# Patient Record
Sex: FEMALE | Race: WHITE | NOT HISPANIC OR LATINO | Employment: FULL TIME | URBAN - METROPOLITAN AREA
[De-identification: names, ages, dates, MRNs, and addresses within clinical notes are randomized per-mention and may not be internally consistent; named-entity substitution may affect disease eponyms.]

---

## 2017-08-09 ENCOUNTER — ALLSCRIPTS OFFICE VISIT (OUTPATIENT)
Dept: OTHER | Facility: OTHER | Age: 37
End: 2017-08-09

## 2017-09-13 ENCOUNTER — ALLSCRIPTS OFFICE VISIT (OUTPATIENT)
Dept: OTHER | Facility: OTHER | Age: 37
End: 2017-09-13

## 2017-10-18 ENCOUNTER — GENERIC CONVERSION - ENCOUNTER (OUTPATIENT)
Dept: OTHER | Facility: OTHER | Age: 37
End: 2017-10-18

## 2017-11-29 ENCOUNTER — ALLSCRIPTS OFFICE VISIT (OUTPATIENT)
Dept: OTHER | Facility: OTHER | Age: 37
End: 2017-11-29

## 2018-01-11 NOTE — MISCELLANEOUS
Message   Recorded as Task   Date: 11/07/2017 12:24 PM, Created By: Shaan Mckee   Task Name: Follow Up   Assigned To: Minal Fernandez   Regarding Patient: Selene Guzmán, Status: Active   [facility] - 07 Nov 2017 12:24 PM     TASK CREATED  Caller: Self; Other; [phone] (Home)  Pt calling with questions regarding the current dosages of meds  She can be reached at [phone]  Thank you   Pt states anxiety and moods have been worse the past couple weeks    On 10 mg Viibryd and no SE- will increase to 20 mg and follow up at appt on 11-29      Plan  Chronic major depressive disorder, recurrent episode, ZARINA (generalized anxiety  disorder)    · From  Viibryd 10 MG Oral Tablet 1 PO qAM with Food To Viibryd 20 MG Oral  Tablet 1 PO QD    Signatures   Electronically signed by : KAY Hou; Nov 7 2017  1:31PM EST                       (Author)

## 2018-01-11 NOTE — PSYCH
Psych Med Mgmt    Appearance: was calm and cooperative, adequate hygiene and grooming and good eye contact  Observed mood: mood appropriate  Observed mood: affect appropriate  Speech: a normal rate  Thought processes: coherent/organized  Hallucinations: no hallucinations present  Thought Content: no delusions  Abnormal Thoughts: The patient has no suicidal thoughts and no homicidal thoughts  Orientation: The patient is oriented to person, place and time  Recent and Remote Memory: short term memory intact and long term memory intact  Attention Span And Concentration: concentration intact  Insight: Insight intact  Judgment: Her judgment was intact  Muscle Strength And Tone  Normal gait and station  Treatment Recommendations: Viibryd 20 mg po qd    Pt partner is going to carry pregnancy when they decide to start a family  Risks, Benefits And Possible Side Effects Of Medications: Risks, benefits, and possible side effects of medications explained to patient and patient verbalizes understanding, Risks of medications explained if female patient  Patient verbalizes understanding and agrees to notify her doctor if she becomes pregnant  She reports normal appetite, normal energy level, no weight change and normal number of sleep hours  Pt seen for follow up ZARINA  Pt states she is feeling better with Viibryd 20 mg po daily  Pt states she is taking Viibryd with lunch  Shes now also following a vegan diet  He focus and concentration has been good  Work has been busy but she is handling things well  Sleeping fairly well  Appetite is good  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 and she is excited about that and enjoys animals  She is more motivated and less anxious  No panic attacks  No depressive episodes or tearfulness for no reasons        Vitals  Signs   Recorded: 61VLJ8775 01:17PM   Respiration: 16  Height: 5 ft 5 in  Weight: 130 lb   BMI Calculated: 21 63  BSA Calculated: 1 65    DSM    Provisional Diagnosis: MDD  ZARINA  Assessment    1  Chronic major depressive disorder, recurrent episode (296 30) (F33 9)   2  ZARINA (generalized anxiety disorder) (300 02) (F41 1)    Plan    1  Viibryd 20 MG Oral Tablet; 1 PO QD    Review of Systems    Constitutional: No fever, no chills, feels well, no tiredness, no recent weight gain or loss  Cardiovascular: no complaints of slow or fast heart rate, no chest pain, no palpitations  Respiratory: no complaints of shortness of breath, no wheezing, no dyspnea on exertion  Gastrointestinal: no complaints of abdominal pain, no constipation, no nausea, no diarrhea, no vomiting  Genitourinary: no complaints of dysuria, no incontinence, no pelvic pain, no urinary frequency  Musculoskeletal: no complaints of arthralgia, no myalgias, no limb pain, no joint stiffness  Integumentary: no complaints of skin rash, no itching, no dry skin  Neurological: no complaints of headache, no confusion, no numbness, no dizziness  Active Problems    1  Chronic major depressive disorder, recurrent episode (296 30) (F33 9)   2  ZARINA (generalized anxiety disorder) (300 02) (F41 1)   3  History of hypothyroidism (V12 29) (Z86 39)    Past Medical History    1  History of hypothyroidism (V12 29) (Z86 39)    Allergies    1  Bactrim TABS    Current Meds   1  Levothyroxine Sodium TABS; Therapy: (Recorded:06Frn8267) to Recorded   2  Viibryd 20 MG Oral Tablet; 1 PO QD; Therapy: 35AFR4349 to (Last ED:24MIC1538)  Requested for: 12JZF6757 Ordered    The medication list was reviewed and updated today  Family Psych History  Mother    1  Family history of Recurrent major depressive disorder, remission status unspecified    Social History    · Former smoker (U92 02) (W01 601)  The social history was reviewed and is unchanged  End of Encounter Meds    1  Viibryd 20 MG Oral Tablet; 1 PO QD;    Therapy: 66IWC0991 to (Last Rx:29Nov2017)  Requested for: 12AET2404 Ordered    2  Levothyroxine Sodium TABS;    Therapy: (Recorded:29Zmg9456) to Recorded    Signatures   Electronically signed by : Karon Fajardo, [facility]; Nov 29 2017  1:30PM EST                       (Author)    Electronically signed by : Unique Ham MD; Nov 30 2017  4:47PM EST

## 2018-01-13 VITALS — BODY MASS INDEX: 21.66 KG/M2 | RESPIRATION RATE: 16 BRPM | WEIGHT: 130 LBS | HEIGHT: 65 IN

## 2018-01-16 NOTE — PSYCH
Assessment    1  History of hypothyroidism (V12 29) (Z86 39)   2  ZARINA (generalized anxiety disorder) (300 02) (F41 1)   3  Chronic major depressive disorder, recurrent episode (296 30) (F33 9)    Plan    1  Viibryd 10 MG Oral Tablet; 1 PO qAM with Food    Chief Complaint  "I guess my moods have been really overwhelmed- the depression has come back"      History of Present Illness  Pt is 30year old  female with history of anxiety and depression  She states she has a low mood  Feels easily overwhelmed and easily tearful  She has mood swings where she feels very irritable, angry and guilt feelings  She denies suicidal ideation but states she feels at times she does not want to go on  She sates she has struggles with anxiety since being in high school  She does not like to be the center of attention  States she had difficulty making friends especially in college- she kept to herself and was feliz drinking on the weekends  She reports a history of cutting superficially in college- none recent  She reports the drinking continued after college and she received 2 DUIs and went to rehab for 30 days in [place] at the age of 29  She has been sober since  She states she also struggled with her sexuality and did not "come out" until the age of 31  She does reports her family has been supportive  Her current spouse was her first romantic relationship  She states she did take Celexa and Lexapro for about 1 year each and did feel much better  She stopped psych meds November 2016 because she thought she no longer needed it when she was started on synthroid for hypothyroid  She states her moods have been declining the past few months  She is anxious, depressed, tearful with poor motivation  She states she falls asleep but awakens often due to racing thoughts- worries about things she said to people days ago, thinks about things that she feels should not be bothering her  She feels tired then during day    She reports a decrease in appetite and 6 pound weight loss the past 6 months  No history of eating disorder  Review of Systems  anxiety, depression, emotional lability and sleep disturbances  Constitutional: No fever, no chills, no recent weight gain or recent weight loss  ENT: no ear ache, no loss of hearing, no nosebleeds or nasal discharge, no sore throat or hoarseness  Cardiovascular: no complaints of slow or fast heart rate, no chest pain, no palpitations, no leg claudication or lower extremity edema  Respiratory: no complaints of shortness of breath, no wheezing, no dyspnea on exertion, no orthopnea or PND  Gastrointestinal: no complaints of abdominal pain, no constipation, no nausea or diarrhea, no vomiting, no bloody stools  Genitourinary: no complaints of dysuria, no incontinence, no pelvic pain, no dysmenorrhea, no vaginal discharge or abnormal vaginal bleeding  Musculoskeletal: no complaints of arthralgia, no myalgia, no joint swelling or stiffness, no limb pain or swelling  Neurological: no complaints of headache, no confusion, no numbness or tingling, no dizziness or fainting  Past Psychiatric History    Past Psychiatric History: Seen at [facility] [phone]  Follows with therapist Babita Bradford x 6 years    No inpt psych but rehab in [place] in 2012 for 30 days- started on Celexa and Neurontin at that time and took for 1 year and took for 1 year  She denies suicide attempts but   she states she took half a whipple of Tylenol when she was about 23years old when she was intoxicated- she does not recall the event as she was intoxicated    Was on Lexapro from PCP - took for about a year and stopped in November  Wellbutrin took once and caused increased anxiety  Substance Abuse Hx    Substance Abuse History: History of ETOH abuse-binge drinking on weekends from age 14- age 29  Sober x 5 years  No other substance use  Past Medical History    1   History of hypothyroidism (V12 29) (Z86 39)    The active problems and past medical history were reviewed and updated today  Allergies    1  Bactrim TABS    Current Meds    The medication list was reviewed and updated today  Family Psych History  Mother    1  Family history of Recurrent major depressive disorder, remission status unspecified  Parents are supportive  Parents are supportive  2 sisters ages 45 and 28- she is middle child    Older sister had postpartum depression  Mother - hospitalized in her 25s- on celexa for anxiety and depression  Paternal uncle is bipolar  Maternal aunt depression  Maternal grandmother was alcoholic and passed away       The family history was reviewed and updated today  Social History    · Former smoker (E28 38) (U95 099)  The social history was reviewed and updated today   to her female partner x 1 year  "Came out of closet" at age 31    Employed by [employer]  History of ETOH abuse - binge drinker on weekends x 14 years  She had 2 DUIs and went ot rehab in [place] in 2012    No current legal issues  Hx of nicotine but quit  She graduated from high school in [address] [facility] to [facility] in [address] to [facility] for [address] and worked fro 1 week in [facility] but quit due to anxiety  No current ETOH iuse  No drug use or substance abuse      Vitals  Signs   Recorded: 14Jtf8082 04:22PM   Heart Rate: 70  Respiration: 16  Systolic: 108  Diastolic: 76  Recorded: 90VTV7291 04:21PM   Respiration: 16  Recorded: 16Tll5867 03:56PM   Height: 5 ft 5 in  Weight: 130 lb   BMI Calculated: 21 63  BSA Calculated: 1 65    Physical Exam    Appearance: was calm and cooperative, adequate hygiene and grooming and good eye contact  Observed mood: depressed and anxious  Observed mood: affect was tearful  Speech: a normal rate  Thought processes: coherent/organized  Hallucinations: no hallucinations present  Thought Content: no delusions  Abnormal Thoughts: The patient has no suicidal thoughts and no homicidal thoughts  Orientation: The patient is oriented to person, place and time  Recent and Remote Memory: short term memory intact and long term memory intact  Attention Span And Concentration: concentration intact  Insight: Insight intact  Judgment: Her judgment was intact  Muscle Strength And Tone  Normal gait and station  Treatment Recommendations: Viibryd 10 mg po qd with food      Discussed zoloft as a second option for her if this is not covered  Risks, Benefits And Possible Side Effects Of Medications: Risks, benefits, and possible side effects of medications explained to patient and patient verbalizes understanding  DSM    Provisional Diagnosis: ZARINA  MDD  End of Encounter Meds    1  Viibryd 10 MG Oral Tablet; 1 PO qAM with Food; Therapy: 65AWW8566 to (Last Rx:04Tfw2670)  Requested for: 33Tps3820; Status:   [facility] Ordered    Signatures   Electronically signed by : Jaelyn Crabtree, [facility];  Aug  9 2017  4:41PM EST                       (Author)    Electronically signed by : Glenda Cruz MD; Aug  9 2017  4:43PM EST

## 2018-01-16 NOTE — PSYCH
Psych Med Mgmt    Appearance: was calm and cooperative  Observed mood: mood appropriate  Observed mood: affect appropriate  Speech: a normal rate  Thought processes: coherent/organized  Hallucinations: no hallucinations present  Thought Content: no delusions  Abnormal Thoughts: The patient has no suicidal thoughts and no homicidal thoughts  Orientation: The patient is oriented to person, place and time  Recent and Remote Memory: short term memory intact and long term memory intact  Attention Span And Concentration: concentration intact  Insight: Insight intact  Judgment: Her judgment was intact  Muscle Strength And Tone  Normal gait and station  Treatment Recommendations: Viibryd 10 mg po qd  Risks, Benefits And Possible Side Effects Of Medications: Risks, benefits, and possible side effects of medications explained to patient and patient verbalizes understanding  She reports normal appetite, normal energy level, no weight change and normal number of sleep hours  Pt seen for follow up ZARINA  She states she is feeling much better with VIibryd  She states she had some diarrhea with the Viibryd but this is subsiding  No other side effects and overall feels much less anxious  More motivated and accomplishing things  She is sleeping well now about 6-8 hours  Appetite is good  She is on steroid dose pack for poison ivy- discussed possible side effects with steroids and mood with pt  Overall she is much improved and tolerating Viibryd well  She will monitor PMS symptoms and severity as we continue with same low does of Viibryd  Vitals  Signs   Recorded: 24Erj2754 08:45AM   Respiration: 16  Height: 5 ft 5 in  Weight: 130 lb   BMI Calculated: 21 63  BSA Calculated: 1 65    DSM    Provisional Diagnosis: ZARINA  MDD  Assessment    1  ZARINA (generalized anxiety disorder) (300 02) (F41 1)   2  Chronic major depressive disorder, recurrent episode (296 30) (F33 9)    Plan    1  Viibryd 10 MG Oral Tablet; 1 PO qAM with Food    Review of Systems    Constitutional: No fever, no chills, feels well, no tiredness, no recent weight gain or loss  Cardiovascular: no complaints of slow or fast heart rate, no chest pain, no palpitations  Respiratory: no complaints of shortness of breath, no wheezing, no dyspnea on exertion  Gastrointestinal: no complaints of abdominal pain, no constipation, no nausea, no diarrhea, no vomiting  Genitourinary: no complaints of dysuria, no incontinence, no pelvic pain, no urinary frequency  Musculoskeletal: no complaints of arthralgia, no myalgias, no limb pain, no joint stiffness  Integumentary: no complaints of skin rash, no itching, no dry skin  Active Problems    1  Chronic major depressive disorder, recurrent episode (296 30) (F33 9)   2  ZARINA (generalized anxiety disorder) (300 02) (F41 1)   3  History of hypothyroidism (V12 29) (Z86 39)    Past Medical History    1  History of hypothyroidism (V12 29) (Z86 39)    Allergies    1  Bactrim TABS    Current Meds   1  Levothyroxine Sodium TABS; Therapy: (Recorded:60Kbu0840) to Recorded   2  Viibryd 10 MG Oral Tablet; 1 PO qAM with Food; Therapy: 64PPE7047 to (Last Rx:40Fyk8398)  Requested for: 15Hpu5607 Ordered    The medication list was reviewed and updated today  Family Psych History  Mother    1  Family history of Recurrent major depressive disorder, remission status unspecified    Social History    · Former smoker (V31 76) (T49 360)  The social history was reviewed and is unchanged  End of Encounter Meds    1  Viibryd 10 MG Oral Tablet; 1 PO qAM with Food; Therapy: 68TKL1858 to (Last Rx:41Ohs2778)  Requested for: 40Cmj8165 Ordered    2  Levothyroxine Sodium TABS;    Therapy: (Recorded:48Afe3477) to Recorded    Signatures   Electronically signed by : Clarissa Urbina, [facility]; Sep 13 2017  9:02AM EST                       (Author)    Electronically signed by : Tomasa Kim MD; Sep 13 2017 4:09PM EST

## 2018-01-22 VITALS — BODY MASS INDEX: 21.66 KG/M2 | WEIGHT: 130 LBS | RESPIRATION RATE: 16 BRPM | HEIGHT: 65 IN

## 2018-01-22 VITALS
SYSTOLIC BLOOD PRESSURE: 119 MMHG | WEIGHT: 130 LBS | RESPIRATION RATE: 16 BRPM | DIASTOLIC BLOOD PRESSURE: 76 MMHG | HEIGHT: 65 IN | BODY MASS INDEX: 21.66 KG/M2 | HEART RATE: 70 BPM

## 2018-02-21 ENCOUNTER — OFFICE VISIT (OUTPATIENT)
Dept: PSYCHIATRY | Facility: CLINIC | Age: 38
End: 2018-02-21
Payer: COMMERCIAL

## 2018-02-21 VITALS — HEIGHT: 65 IN | BODY MASS INDEX: 21.66 KG/M2 | WEIGHT: 130 LBS | RESPIRATION RATE: 16 BRPM

## 2018-02-21 DIAGNOSIS — F33.9 CHRONIC MAJOR DEPRESSIVE DISORDER, RECURRENT EPISODE (HCC): Primary | ICD-10-CM

## 2018-02-21 DIAGNOSIS — F41.1 GAD (GENERALIZED ANXIETY DISORDER): ICD-10-CM

## 2018-02-21 PROCEDURE — 99214 OFFICE O/P EST MOD 30 MIN: CPT | Performed by: PHYSICIAN ASSISTANT

## 2018-02-21 RX ORDER — GABAPENTIN 100 MG/1
100 CAPSULE ORAL EVERY 6 HOURS PRN
Qty: 60 CAPSULE | Refills: 3 | Status: SHIPPED | OUTPATIENT
Start: 2018-02-21 | End: 2018-05-23

## 2018-02-21 RX ORDER — VILAZODONE HYDROCHLORIDE 20 MG/1
TABLET ORAL DAILY
COMMUNITY
Start: 2017-08-09 | End: 2018-02-21 | Stop reason: SDUPTHER

## 2018-02-21 RX ORDER — VILAZODONE HYDROCHLORIDE 20 MG/1
20 TABLET ORAL DAILY
Qty: 30 TABLET | Refills: 3 | Status: SHIPPED | OUTPATIENT
Start: 2018-02-21 | End: 2018-05-23 | Stop reason: SDUPTHER

## 2018-02-21 NOTE — PSYCH
PROGRESS NOTE        [facility]      Name and Date of Birth:  Mike Epps 30 y o  1980    Date of Visit: 02/21/18    SUBJECTIVE:  Pt seen for follow up Depression/ anxiety  She states she feels "alright"    She states she continues with some anxiety and feels as though she has difficulties handling stressors  She denies suicidal ideation, intent or plan at present, has no suicidal ideation, intent or plan at present  She denies any auditory hallucinations and visual hallucinations, denies any other delusional thinking, denies any delusional thinking  She denies any side effects from medications    HPI ROS Appetite Changes and Sleep: normal appetite, normal sleep    Review Of Systems:      Constitutional Negative   ENT Negative   Cardiovascular Negative   Respiratory Negative   Gastrointestinal Negative   Genitourinary Negative   Musculoskeletal Negative   Integumentary Negative   Neurological Negative   Endocrine Negative   Other Symptoms Negative and None       Laboratory Results: No results found for this or any previous visit  Substance Abuse History:    History   Drug use: Unknown       Family Psychiatric History:     No family history on file  The following portions of the patient's history were reviewed and updated as appropriate: past family history, past medical history, past social history, past surgical history and problem list     Social History     Social History    Marital status: /Civil Union     Spouse name: N/A    Number of children: N/A    Years of education: N/A     Occupational History    Not on file       Social History Main Topics    Smoking status: Not on file    Smokeless tobacco: Not on file    Alcohol use Not on file    Drug use: Unknown    Sexual activity: Not on file     Other Topics Concern    Not on file     Social History Narrative    No narrative on file     Social History     Social History Narrative    No narrative on file        Social History    None             OBJECTIVE:     Mental Status Evaluation:    Appearance age appropriate, casually dressed   Behavior pleasant, cooperative   Speech normal volume, normal pitch   Mood anxious   Affect Mood congruent   Thought Processes logical   Associations intact associations   Thought Content normal   Perceptual Disturbances: none   Abnormal Thoughts  Risk Potential Suicidal ideation - None  Homicidal ideation - None  Potential for aggression - No   Orientation oriented to person, place, time/date and situation   Memory recent and remote memory grossly intact   Cosciousness alert and awake   Attention Span attention span and concentration are age appropriate   Intellect Appears to be of Average Intelligence   Insight age appropriate    Judgement good    Muscle Strength and  Gait muscle strength and tone were normal   Language no difficulty naming common objects   Fund of Knowledge displays adequate knowledge of current events   Pain none   Pain Scale 0       Assessment/Plan:       Diagnoses and all orders for this visit:    Chronic major depressive disorder, recurrent episode (HCC)    ZARINA (generalized anxiety disorder)    Other orders  -     vilazodone (VIIBRYD) 20 mg tablet; Take by mouth daily          Treatment Recommendations/Precautions:  Viibryd 20 mg po qd  Neurontin 100 mg po 6 hours prn    Risks/Benefits      Risks, Benefits And Possible Side Effects Of Medications:    Risks, benefits, and possible side effects of medications explained to patient and patient verbalizes understanding and agreement for treatment      Controlled Medication Discussion:     Not applicable    Psychotherapy Provided:     Individual psychotherapy provided: No

## 2018-05-23 ENCOUNTER — OFFICE VISIT (OUTPATIENT)
Dept: PSYCHIATRY | Facility: CLINIC | Age: 38
End: 2018-05-23
Payer: COMMERCIAL

## 2018-05-23 VITALS — WEIGHT: 138 LBS | HEIGHT: 65 IN | RESPIRATION RATE: 16 BRPM | BODY MASS INDEX: 22.99 KG/M2

## 2018-05-23 DIAGNOSIS — F33.9 CHRONIC MAJOR DEPRESSIVE DISORDER, RECURRENT EPISODE (HCC): Primary | ICD-10-CM

## 2018-05-23 DIAGNOSIS — F41.1 GAD (GENERALIZED ANXIETY DISORDER): ICD-10-CM

## 2018-05-23 PROCEDURE — 99214 OFFICE O/P EST MOD 30 MIN: CPT | Performed by: PHYSICIAN ASSISTANT

## 2018-05-23 RX ORDER — VILAZODONE HYDROCHLORIDE 20 MG/1
20 TABLET ORAL DAILY
Qty: 30 TABLET | Refills: 4 | Status: SHIPPED | OUTPATIENT
Start: 2018-05-23 | End: 2018-09-26

## 2018-05-23 RX ORDER — LEVOTHYROXINE SODIUM 175 UG/1
TABLET ORAL
COMMUNITY

## 2018-05-23 NOTE — PSYCH
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  She denies suicidal ideation, intent or plan at present, has no suicidal ideation, intent or plan at present  She denies any auditory hallucinations and visual hallucinations, denies any other delusional thinking, denies any delusional thinking  She denies any side effects from medications  Boy Glass HPI ROS Appetite Changes and Sleep: normal appetite, normal sleep    Review Of Systems:      Constitutional Negative   ENT Negative   Cardiovascular Negative   Respiratory Negative   Gastrointestinal Negative   Genitourinary Negative   Musculoskeletal Negative   Integumentary Negative   Neurological Negative   Endocrine Negative   Other Symptoms Negative and None       Laboratory Results: No results found for this or any previous visit  Substance Abuse History:    History   Drug use: Unknown       Family Psychiatric History:     No family history on file  The following portions of the patient's history were reviewed and updated as appropriate: past family history, past medical history, past social history, past surgical history and problem list     Social History     Social History    Marital status: /Civil Union     Spouse name: N/A    Number of children: N/A    Years of education: N/A     Occupational History    Not on file       Social History Main Topics    Smoking status: Not on file    Smokeless tobacco: Not on file    Alcohol use Not on file    Drug use: Unknown    Sexual activity: Not on file     Other Topics Concern    Not on file     Social History Narrative    No narrative on file     Social History     Social History Narrative    No narrative on file        Social History    None             OBJECTIVE:     Mental Status Evaluation:    Appearance age appropriate, casually dressed   Behavior pleasant, cooperative   Speech normal volume, normal pitch   Mood Euthymic appropriate   Affect Mod congruent   Thought Processes logical   Associations intact associations   Thought Content normal   Perceptual Disturbances: none   Abnormal Thoughts  Risk Potential Suicidal ideation - None  Homicidal ideation - None  Potential for aggression - No   Orientation oriented to person, place, time/date and situation   Memory recent and remote memory grossly intact   Cosciousness alert and awake   Attention Span attention span and concentration are age appropriate   Intellect Appears to be of Average Intelligence   Insight age appropriate    Judgement good    Muscle Strength and  Gait muscle strength and tone were normal   Language no difficulty naming common objects   Fund of Knowledge displays adequate knowledge of current events   Pain none   Pain Scale 0       Assessment/Plan:       Diagnoses and all orders for this visit:    Chronic major depressive disorder, recurrent episode (HCC)    ZARINA (generalized anxiety disorder)    Other orders  -     levothyroxine 175 mcg tablet; Take by mouth          Treatment Recommendations/Precautions:    D/C Neurontin    Continue current medications: Viibryd 20 mg po qd    Risks/Benefits      Risks, Benefits And Possible Side Effects Of Medications:    Risks, benefits, and possible side effects of medications explained to patient and patient verbalizes understanding and agreement for treatment      Controlled Medication Discussion:     Not applicable    Psychotherapy Provided: Individual psychotherapy provided: No

## 2018-07-12 DIAGNOSIS — F41.1 GAD (GENERALIZED ANXIETY DISORDER): ICD-10-CM

## 2018-07-12 DIAGNOSIS — F33.9 CHRONIC MAJOR DEPRESSIVE DISORDER, RECURRENT EPISODE (HCC): ICD-10-CM

## 2018-07-13 RX ORDER — VILAZODONE HYDROCHLORIDE 20 MG/1
TABLET ORAL
Qty: 30 TABLET | Refills: 3 | OUTPATIENT
Start: 2018-07-13

## 2018-09-26 ENCOUNTER — OFFICE VISIT (OUTPATIENT)
Dept: PSYCHIATRY | Facility: CLINIC | Age: 38
End: 2018-09-26
Payer: COMMERCIAL

## 2018-09-26 VITALS — RESPIRATION RATE: 16 BRPM | WEIGHT: 136 LBS | BODY MASS INDEX: 22.63 KG/M2

## 2018-09-26 DIAGNOSIS — F33.9 CHRONIC MAJOR DEPRESSIVE DISORDER, RECURRENT EPISODE (HCC): Primary | ICD-10-CM

## 2018-09-26 DIAGNOSIS — F41.1 GAD (GENERALIZED ANXIETY DISORDER): ICD-10-CM

## 2018-09-26 PROCEDURE — 99214 OFFICE O/P EST MOD 30 MIN: CPT | Performed by: PHYSICIAN ASSISTANT

## 2018-09-26 NOTE — PSYCH
PROGRESS NOTE        Isai Arellano      Name and Date of Birth:  Mayra Jeter 41 y o  1980    Date of Visit: 09/26/18    SUBJECTIVE:  Pt seen for follow up  She states she has been less depression overall with Viibryd  Reports that she has mood swings though and will cry for no reason at times  Increase stress causes more erattic moods  She is sleeping well and appetite is good  Also states some issues with getting Viibryd covered by insurance  She denies suicidal ideation, intent or plan at present, has no suicidal ideation, intent or plan at present  She denies any auditory hallucinations and visual hallucinations, denies any other delusional thinking, denies any delusional thinking  She denies any side effects from medications    HPI ROS Appetite Changes and Sleep: normal appetite, normal sleep    Review Of Systems:      Constitutional Negative   ENT Negative   Cardiovascular Negative   Respiratory Negative   Gastrointestinal Negative   Genitourinary Negative   Musculoskeletal Negative   Integumentary Negative   Neurological Negative   Endocrine Negative   Other Symptoms Negative and None       Laboratory Results: No results found for this or any previous visit  Substance Abuse History:    History   Drug use: Unknown       Family Psychiatric History:     No family history on file  The following portions of the patient's history were reviewed and updated as appropriate: past family history, past medical history, past social history, past surgical history and problem list     Social History     Social History    Marital status: /Civil Union     Spouse name: N/A    Number of children: N/A    Years of education: N/A     Occupational History    Not on file       Social History Main Topics    Smoking status: Not on file    Smokeless tobacco: Not on file    Alcohol use Not on file    Drug use: Unknown    Sexual activity: Not on file     Other Topics Concern    Not on file     Social History Narrative    No narrative on file     Social History     Social History Narrative    No narrative on file        Social History    None             OBJECTIVE:     Mental Status Evaluation:    Appearance age appropriate, casually dressed   Behavior pleasant, cooperative   Speech normal volume, normal pitch   Mood anxious   Affect anxious   Thought Processes logical   Associations intact associations   Thought Content normal   Perceptual Disturbances: none   Abnormal Thoughts  Risk Potential Suicidal ideation - None  Homicidal ideation - None  Potential for aggression - No   Orientation oriented to person, place, time/date and situation   Memory recent and remote memory grossly intact   Cosciousness alert and awake   Attention Span attention span and concentration are age appropriate   Intellect Appears to be of Average Intelligence   Insight age appropriate    Judgement good    Muscle Strength and  Gait muscle strength and tone were normal   Language no difficulty naming common objects   Fund of Knowledge displays adequate knowledge of current events   Pain none   Pain Scale 0       Assessment/Plan:       Diagnoses and all orders for this visit:    Chronic major depressive disorder, recurrent episode (HCC)  -     sertraline (ZOLOFT) 50 mg tablet; Take 1 tablet (50 mg total) by mouth daily    ZARINA (generalized anxiety disorder)  -     sertraline (ZOLOFT) 50 mg tablet; Take 1 tablet (50 mg total) by mouth daily          Treatment Recommendations/Precautions:  D/C Vibryd   Zoloft 50 mg po qd    Continue current medications    Risks/Benefits      Risks, Benefits And Possible Side Effects Of Medications:    Risks, benefits, and possible side effects of medications explained to patient and patient verbalizes understanding and agreement for treatment      Controlled Medication Discussion:     Not applicable    Psychotherapy Provided:     Individual psychotherapy provided: No

## 2018-11-16 ENCOUNTER — TELEPHONE (OUTPATIENT)
Dept: PSYCHIATRY | Facility: CLINIC | Age: 38
End: 2018-11-16

## 2018-11-16 DIAGNOSIS — F33.9 CHRONIC MAJOR DEPRESSIVE DISORDER, RECURRENT EPISODE (HCC): ICD-10-CM

## 2018-11-16 DIAGNOSIS — F41.1 GAD (GENERALIZED ANXIETY DISORDER): ICD-10-CM

## 2018-11-16 NOTE — TELEPHONE ENCOUNTER
Pt overall feeling much better with Zolofy than Viibryd  Spoke with pt and will increase zoloft to 75 mg total daily due to continued anxiety   RX sent to [facility] for 90 day supply

## 2019-05-08 DIAGNOSIS — F33.9 CHRONIC MAJOR DEPRESSIVE DISORDER, RECURRENT EPISODE (HCC): ICD-10-CM

## 2019-05-08 DIAGNOSIS — F41.1 GAD (GENERALIZED ANXIETY DISORDER): ICD-10-CM

## 2025-08-22 DIAGNOSIS — F33.9 CHRONIC MAJOR DEPRESSIVE DISORDER, RECURRENT EPISODE (HCC): Primary | ICD-10-CM

## 2025-08-22 PROBLEM — E78.00 HYPERCHOLESTEREMIA: Status: ACTIVE | Noted: 2025-08-22

## 2025-08-22 PROBLEM — E03.9 HYPOTHYROID: Status: ACTIVE | Noted: 2025-08-22

## 2025-08-22 PROBLEM — G47.00 INSOMNIA: Status: ACTIVE | Noted: 2025-08-22

## 2025-08-22 RX ORDER — CITALOPRAM HYDROBROMIDE 20 MG/1
20 TABLET ORAL DAILY
COMMUNITY
End: 2025-08-22 | Stop reason: SDUPTHER

## 2025-08-22 RX ORDER — CITALOPRAM HYDROBROMIDE 20 MG/1
20 TABLET ORAL DAILY
Qty: 90 TABLET | Refills: 1 | Status: SHIPPED | OUTPATIENT
Start: 2025-08-22

## 2025-08-22 RX ORDER — LEVOTHYROXINE SODIUM 50 UG/1
TABLET ORAL
COMMUNITY
Start: 2025-06-21

## 2025-08-22 RX ORDER — ALBUTEROL SULFATE 90 UG/1
2 INHALANT RESPIRATORY (INHALATION) EVERY 4 HOURS PRN
COMMUNITY